# Patient Record
Sex: FEMALE | Race: WHITE | NOT HISPANIC OR LATINO
[De-identification: names, ages, dates, MRNs, and addresses within clinical notes are randomized per-mention and may not be internally consistent; named-entity substitution may affect disease eponyms.]

---

## 2017-02-14 ENCOUNTER — APPOINTMENT (OUTPATIENT)
Dept: DERMATOLOGY | Facility: CLINIC | Age: 62
End: 2017-02-14

## 2017-07-23 ENCOUNTER — TRANSCRIPTION ENCOUNTER (OUTPATIENT)
Age: 62
End: 2017-07-23

## 2017-10-31 ENCOUNTER — APPOINTMENT (OUTPATIENT)
Dept: FAMILY MEDICINE | Facility: CLINIC | Age: 62
End: 2017-10-31
Payer: COMMERCIAL

## 2017-10-31 ENCOUNTER — NON-APPOINTMENT (OUTPATIENT)
Age: 62
End: 2017-10-31

## 2017-10-31 VITALS
OXYGEN SATURATION: 98 % | HEART RATE: 67 BPM | RESPIRATION RATE: 14 BRPM | SYSTOLIC BLOOD PRESSURE: 110 MMHG | WEIGHT: 146 LBS | BODY MASS INDEX: 25.87 KG/M2 | DIASTOLIC BLOOD PRESSURE: 70 MMHG | HEIGHT: 63 IN

## 2017-10-31 DIAGNOSIS — Z13.820 ENCOUNTER FOR SCREENING FOR OSTEOPOROSIS: ICD-10-CM

## 2017-10-31 PROCEDURE — 99396 PREV VISIT EST AGE 40-64: CPT | Mod: 25

## 2017-10-31 PROCEDURE — 36415 COLL VENOUS BLD VENIPUNCTURE: CPT

## 2017-10-31 PROCEDURE — 93000 ELECTROCARDIOGRAM COMPLETE: CPT

## 2017-10-31 PROCEDURE — 90688 IIV4 VACCINE SPLT 0.5 ML IM: CPT

## 2017-10-31 PROCEDURE — G0008: CPT

## 2017-11-01 LAB
25(OH)D3 SERPL-MCNC: 47 NG/ML
ALBUMIN SERPL ELPH-MCNC: 4.3 G/DL
ALP BLD-CCNC: 67 U/L
ALT SERPL-CCNC: 25 U/L
ANION GAP SERPL CALC-SCNC: 13 MMOL/L
APPEARANCE: CLEAR
AST SERPL-CCNC: 28 U/L
BACTERIA: NEGATIVE
BASOPHILS # BLD AUTO: 0.03 K/UL
BASOPHILS NFR BLD AUTO: 0.4 %
BILIRUB SERPL-MCNC: 0.3 MG/DL
BILIRUBIN URINE: NEGATIVE
BLOOD URINE: NEGATIVE
BUN SERPL-MCNC: 9 MG/DL
CALCIUM SERPL-MCNC: 9.3 MG/DL
CHLORIDE SERPL-SCNC: 105 MMOL/L
CHOLEST SERPL-MCNC: 189 MG/DL
CHOLEST/HDLC SERPL: 3.2 RATIO
CO2 SERPL-SCNC: 25 MMOL/L
COLOR: YELLOW
CREAT SERPL-MCNC: 0.8 MG/DL
EOSINOPHIL # BLD AUTO: 0.17 K/UL
EOSINOPHIL NFR BLD AUTO: 2.5 %
FOLATE SERPL-MCNC: >20 NG/ML
GLUCOSE QUALITATIVE U: NEGATIVE MG/DL
GLUCOSE SERPL-MCNC: 98 MG/DL
HBA1C MFR BLD HPLC: 5.6 %
HCT VFR BLD CALC: 43.1 %
HDLC SERPL-MCNC: 59 MG/DL
HGB BLD-MCNC: 13.9 G/DL
HYALINE CASTS: 0 /LPF
IMM GRANULOCYTES NFR BLD AUTO: 0.3 %
KETONES URINE: NEGATIVE
LDLC SERPL CALC-MCNC: 113 MG/DL
LEUKOCYTE ESTERASE URINE: NEGATIVE
LYMPHOCYTES # BLD AUTO: 2.1 K/UL
LYMPHOCYTES NFR BLD AUTO: 30.5 %
MAN DIFF?: NORMAL
MCHC RBC-ENTMCNC: 29.1 PG
MCHC RBC-ENTMCNC: 32.3 GM/DL
MCV RBC AUTO: 90.4 FL
MICROSCOPIC-UA: NORMAL
MONOCYTES # BLD AUTO: 0.33 K/UL
MONOCYTES NFR BLD AUTO: 4.8 %
NEUTROPHILS # BLD AUTO: 4.23 K/UL
NEUTROPHILS NFR BLD AUTO: 61.5 %
NITRITE URINE: NEGATIVE
PH URINE: 6
PLATELET # BLD AUTO: 217 K/UL
POTASSIUM SERPL-SCNC: 4.6 MMOL/L
PROT SERPL-MCNC: 7.3 G/DL
PROTEIN URINE: NEGATIVE MG/DL
RBC # BLD: 4.77 M/UL
RBC # FLD: 13.7 %
RED BLOOD CELLS URINE: 0 /HPF
SODIUM SERPL-SCNC: 143 MMOL/L
SPECIFIC GRAVITY URINE: 1.01
SQUAMOUS EPITHELIAL CELLS: 4 /HPF
T4 FREE SERPL-MCNC: 1.2 NG/DL
TRIGL SERPL-MCNC: 87 MG/DL
TSH SERPL-ACNC: 1.21 UIU/ML
URATE SERPL-MCNC: 3.7 MG/DL
UROBILINOGEN URINE: NEGATIVE MG/DL
VIT B12 SERPL-MCNC: 613 PG/ML
WBC # FLD AUTO: 6.88 K/UL
WHITE BLOOD CELLS URINE: 1 /HPF

## 2017-11-14 LAB — HEMOCCULT STL QL IA: NEGATIVE

## 2017-11-16 ENCOUNTER — FORM ENCOUNTER (OUTPATIENT)
Age: 62
End: 2017-11-16

## 2017-11-17 ENCOUNTER — OUTPATIENT (OUTPATIENT)
Dept: OUTPATIENT SERVICES | Facility: HOSPITAL | Age: 62
LOS: 1 days | End: 2017-11-17
Payer: COMMERCIAL

## 2017-11-17 ENCOUNTER — APPOINTMENT (OUTPATIENT)
Dept: RADIOLOGY | Facility: CLINIC | Age: 62
End: 2017-11-17
Payer: COMMERCIAL

## 2017-11-17 DIAGNOSIS — Z00.8 ENCOUNTER FOR OTHER GENERAL EXAMINATION: ICD-10-CM

## 2017-11-17 PROCEDURE — 77080 DXA BONE DENSITY AXIAL: CPT | Mod: 26

## 2017-11-17 PROCEDURE — 77080 DXA BONE DENSITY AXIAL: CPT

## 2017-11-20 ENCOUNTER — RESULT REVIEW (OUTPATIENT)
Age: 62
End: 2017-11-20

## 2017-11-28 ENCOUNTER — RESULT REVIEW (OUTPATIENT)
Age: 62
End: 2017-11-28

## 2017-12-14 ENCOUNTER — FORM ENCOUNTER (OUTPATIENT)
Age: 62
End: 2017-12-14

## 2017-12-15 ENCOUNTER — OUTPATIENT (OUTPATIENT)
Dept: OUTPATIENT SERVICES | Facility: HOSPITAL | Age: 62
LOS: 1 days | End: 2017-12-15
Payer: COMMERCIAL

## 2017-12-15 ENCOUNTER — APPOINTMENT (OUTPATIENT)
Dept: RADIOLOGY | Facility: CLINIC | Age: 62
End: 2017-12-15
Payer: COMMERCIAL

## 2017-12-15 ENCOUNTER — APPOINTMENT (OUTPATIENT)
Dept: FAMILY MEDICINE | Facility: CLINIC | Age: 62
End: 2017-12-15
Payer: COMMERCIAL

## 2017-12-15 VITALS
OXYGEN SATURATION: 98 % | WEIGHT: 146 LBS | SYSTOLIC BLOOD PRESSURE: 110 MMHG | HEART RATE: 74 BPM | BODY MASS INDEX: 25.87 KG/M2 | RESPIRATION RATE: 12 BRPM | HEIGHT: 63 IN | TEMPERATURE: 97.8 F | DIASTOLIC BLOOD PRESSURE: 64 MMHG

## 2017-12-15 DIAGNOSIS — Z00.8 ENCOUNTER FOR OTHER GENERAL EXAMINATION: ICD-10-CM

## 2017-12-15 PROCEDURE — 71046 X-RAY EXAM CHEST 2 VIEWS: CPT

## 2017-12-15 PROCEDURE — 99213 OFFICE O/P EST LOW 20 MIN: CPT

## 2017-12-15 PROCEDURE — 71020: CPT | Mod: 26

## 2017-12-15 RX ORDER — NEOMYCIN AND POLYMYXIN B SULFATES AND DEXAMETHASONE 3.5; 10000; 1 MG/G; [IU]/G; MG/G
3.5-10000-0.1 OINTMENT OPHTHALMIC
Qty: 4 | Refills: 0 | Status: DISCONTINUED | COMMUNITY
Start: 2017-06-20

## 2017-12-15 RX ORDER — AMOXICILLIN AND CLAVULANATE POTASSIUM 875; 125 MG/1; MG/1
875-125 TABLET, COATED ORAL
Qty: 14 | Refills: 0 | Status: DISCONTINUED | COMMUNITY
Start: 2017-10-07

## 2018-03-02 ENCOUNTER — APPOINTMENT (OUTPATIENT)
Dept: FAMILY MEDICINE | Facility: CLINIC | Age: 63
End: 2018-03-02
Payer: COMMERCIAL

## 2018-03-02 VITALS
HEIGHT: 63 IN | DIASTOLIC BLOOD PRESSURE: 70 MMHG | OXYGEN SATURATION: 98 % | HEART RATE: 67 BPM | TEMPERATURE: 98.5 F | SYSTOLIC BLOOD PRESSURE: 120 MMHG | RESPIRATION RATE: 14 BRPM | WEIGHT: 142 LBS | BODY MASS INDEX: 25.16 KG/M2

## 2018-03-02 DIAGNOSIS — Z87.09 PERSONAL HISTORY OF OTHER DISEASES OF THE RESPIRATORY SYSTEM: ICD-10-CM

## 2018-03-02 PROCEDURE — 99214 OFFICE O/P EST MOD 30 MIN: CPT

## 2018-03-02 RX ORDER — FLUTICASONE PROPIONATE 50 UG/1
50 SPRAY, METERED NASAL DAILY
Qty: 1 | Refills: 0 | Status: ACTIVE | COMMUNITY
Start: 2018-03-02 | End: 1900-01-01

## 2018-03-02 RX ORDER — AZITHROMYCIN 250 MG/1
250 TABLET, FILM COATED ORAL
Qty: 1 | Refills: 0 | Status: DISCONTINUED | COMMUNITY
Start: 2017-12-15 | End: 2018-03-02

## 2018-05-16 ENCOUNTER — APPOINTMENT (OUTPATIENT)
Dept: FAMILY MEDICINE | Facility: CLINIC | Age: 63
End: 2018-05-16
Payer: COMMERCIAL

## 2018-05-16 VITALS
SYSTOLIC BLOOD PRESSURE: 106 MMHG | TEMPERATURE: 98.5 F | WEIGHT: 146.25 LBS | HEART RATE: 68 BPM | DIASTOLIC BLOOD PRESSURE: 64 MMHG | OXYGEN SATURATION: 98 % | HEIGHT: 62 IN | BODY MASS INDEX: 26.91 KG/M2

## 2018-05-16 DIAGNOSIS — J30.9 ALLERGIC RHINITIS, UNSPECIFIED: ICD-10-CM

## 2018-05-16 PROCEDURE — 99213 OFFICE O/P EST LOW 20 MIN: CPT

## 2018-05-16 RX ORDER — AMOXICILLIN AND CLAVULANATE POTASSIUM 875; 125 MG/1; MG/1
875-125 TABLET, COATED ORAL
Qty: 14 | Refills: 0 | Status: DISCONTINUED | COMMUNITY
Start: 2018-03-02 | End: 2018-05-16

## 2018-05-16 NOTE — REVIEW OF SYSTEMS
[Fever] : no fever [Chills] : no chills [Chest Pain] : no chest pain [Shortness Of Breath] : no shortness of breath [Cough] : no cough [FreeTextEntry4] : as per HPI

## 2018-05-16 NOTE — HISTORY OF PRESENT ILLNESS
[FreeTextEntry8] : \par 62 year old female presents c/o bilateral ears feeling clogged along with intermittent ear pains, sinus congestion and pressure. No cough, no fever. Symptoms started a few weeks ago, has been using Fluticasone nasal spray with some improvement but over the past couple of days, ear pain developed.

## 2018-05-16 NOTE — ASSESSMENT
[FreeTextEntry1] : c/w Fluticasone nasal spray\par over the counter Claritin\par consider Medrol dose pack if no improvement\par

## 2018-05-16 NOTE — PHYSICAL EXAM
[No Acute Distress] : no acute distress [Well Nourished] : well nourished [Well Developed] : well developed [Normal Oropharynx] : the oropharynx was normal [Normal Appearance] : was normal in appearance [Neck Supple] : was supple [No Respiratory Distress] : no respiratory distress  [Clear to Auscultation] : lungs were clear to auscultation bilaterally [Normal Rhythm/Effort] : normal respiratory rhythm and effort [Normal Rate] : normal rate  [Regular Rhythm] : with a regular rhythm [Normal S1, S2] : normal S1 and S2 [No Murmur] : no murmur heard [Normal Anterior Cervical Nodes] : no anterior cervical lymphadenopathy [Alert and Oriented x3] : oriented to person, place, and time [de-identified] : Bilateral TM's congested, no erythema; no sinus tenderness; postnasal drip present

## 2018-05-21 ENCOUNTER — RX RENEWAL (OUTPATIENT)
Age: 63
End: 2018-05-21

## 2018-07-20 ENCOUNTER — APPOINTMENT (OUTPATIENT)
Dept: FAMILY MEDICINE | Facility: CLINIC | Age: 63
End: 2018-07-20
Payer: COMMERCIAL

## 2018-07-20 VITALS
WEIGHT: 144 LBS | HEART RATE: 73 BPM | DIASTOLIC BLOOD PRESSURE: 70 MMHG | TEMPERATURE: 98.3 F | OXYGEN SATURATION: 98 % | SYSTOLIC BLOOD PRESSURE: 120 MMHG | HEIGHT: 62 IN | BODY MASS INDEX: 26.5 KG/M2

## 2018-07-20 DIAGNOSIS — H69.83 OTHER SPECIFIED DISORDERS OF EUSTACHIAN TUBE, BILATERAL: ICD-10-CM

## 2018-07-20 DIAGNOSIS — Z87.09 PERSONAL HISTORY OF OTHER DISEASES OF THE RESPIRATORY SYSTEM: ICD-10-CM

## 2018-07-20 PROCEDURE — 99213 OFFICE O/P EST LOW 20 MIN: CPT

## 2018-07-20 NOTE — HISTORY OF PRESENT ILLNESS
[FreeTextEntry8] : \par 62 year old female presents c/o ringing in her ears, ears feeling clogged, feeling slightly off balance x 1 week. No ear pain. Has been on Ibuprofen regularly to treat foot pain which was advised by her podiatrist, concerned could be masking ear infection. Taking Claritin, using Flonase nasal spray.\par \par Reports up to date with mammogram.

## 2018-07-20 NOTE — ASSESSMENT
[FreeTextEntry1] : c/w Fluticasone nasal spray, Claritin\par if symptoms worsen, start Medrol dose pack (hold Ibuprofen/NSAIDS at that time)\par see ENT if symptoms persist

## 2018-07-20 NOTE — PHYSICAL EXAM
[No Acute Distress] : no acute distress [Well Nourished] : well nourished [Well Developed] : well developed [Well-Appearing] : well-appearing [Normal Appearance] : was normal in appearance [Neck Supple] : was supple [No Respiratory Distress] : no respiratory distress  [Clear to Auscultation] : lungs were clear to auscultation bilaterally [Normal Rhythm/Effort] : normal respiratory rhythm and effort [Normal Rate] : normal rate  [Regular Rhythm] : with a regular rhythm [Normal S1, S2] : normal S1 and S2 [No Murmur] : no murmur heard [Normal Anterior Cervical Nodes] : no anterior cervical lymphadenopathy [Alert and Oriented x3] : oriented to person, place, and time [de-identified] : bilateral TM's congested, right>left, no erythema; no sinus tenderness; postnasal drip present

## 2018-11-02 ENCOUNTER — NON-APPOINTMENT (OUTPATIENT)
Age: 63
End: 2018-11-02

## 2018-11-02 ENCOUNTER — LABORATORY RESULT (OUTPATIENT)
Age: 63
End: 2018-11-02

## 2018-11-02 ENCOUNTER — APPOINTMENT (OUTPATIENT)
Dept: FAMILY MEDICINE | Facility: CLINIC | Age: 63
End: 2018-11-02
Payer: COMMERCIAL

## 2018-11-02 VITALS
HEART RATE: 65 BPM | OXYGEN SATURATION: 97 % | SYSTOLIC BLOOD PRESSURE: 100 MMHG | WEIGHT: 145.25 LBS | DIASTOLIC BLOOD PRESSURE: 82 MMHG | HEIGHT: 63 IN | BODY MASS INDEX: 25.73 KG/M2

## 2018-11-02 VITALS — SYSTOLIC BLOOD PRESSURE: 102 MMHG | RESPIRATION RATE: 14 BRPM | DIASTOLIC BLOOD PRESSURE: 60 MMHG

## 2018-11-02 DIAGNOSIS — Z12.11 ENCOUNTER FOR SCREENING FOR MALIGNANT NEOPLASM OF COLON: ICD-10-CM

## 2018-11-02 DIAGNOSIS — Z13.220 ENCOUNTER FOR SCREENING FOR LIPOID DISORDERS: ICD-10-CM

## 2018-11-02 DIAGNOSIS — Z13.0 ENCOUNTER FOR SCREENING FOR OTHER SUSPECTED ENDOCRINE DISORDER: ICD-10-CM

## 2018-11-02 DIAGNOSIS — Z11.59 ENCOUNTER FOR SCREENING FOR OTHER VIRAL DISEASES: ICD-10-CM

## 2018-11-02 DIAGNOSIS — Z13.29 ENCOUNTER FOR SCREENING FOR OTHER SUSPECTED ENDOCRINE DISORDER: ICD-10-CM

## 2018-11-02 DIAGNOSIS — M85.80 OTHER SPECIFIED DISORDERS OF BONE DENSITY AND STRUCTURE, UNSPECIFIED SITE: ICD-10-CM

## 2018-11-02 DIAGNOSIS — Z23 ENCOUNTER FOR IMMUNIZATION: ICD-10-CM

## 2018-11-02 DIAGNOSIS — Z13.228 ENCOUNTER FOR SCREENING FOR OTHER SUSPECTED ENDOCRINE DISORDER: ICD-10-CM

## 2018-11-02 DIAGNOSIS — M19.90 UNSPECIFIED OSTEOARTHRITIS, UNSPECIFIED SITE: ICD-10-CM

## 2018-11-02 PROCEDURE — G0008: CPT

## 2018-11-02 PROCEDURE — 99396 PREV VISIT EST AGE 40-64: CPT | Mod: 25

## 2018-11-02 PROCEDURE — 90686 IIV4 VACC NO PRSV 0.5 ML IM: CPT

## 2018-11-02 PROCEDURE — 93000 ELECTROCARDIOGRAM COMPLETE: CPT

## 2018-11-02 RX ORDER — METHYLPREDNISOLONE 4 MG/1
4 TABLET ORAL
Qty: 1 | Refills: 0 | Status: DISCONTINUED | COMMUNITY
Start: 2018-05-21 | End: 2018-11-02

## 2018-11-05 LAB
25(OH)D3 SERPL-MCNC: 41.9 NG/ML
ALBUMIN SERPL ELPH-MCNC: 4.5 G/DL
ALP BLD-CCNC: 69 U/L
ALT SERPL-CCNC: 25 U/L
ANION GAP SERPL CALC-SCNC: 13 MMOL/L
APPEARANCE: CLEAR
AST SERPL-CCNC: 25 U/L
B BURGDOR IGG+IGM SER QL IB: NORMAL
BACTERIA: NEGATIVE
BASOPHILS # BLD AUTO: 0.03 K/UL
BASOPHILS NFR BLD AUTO: 0.4 %
BILIRUB SERPL-MCNC: 0.5 MG/DL
BILIRUBIN URINE: NEGATIVE
BLOOD URINE: NEGATIVE
BUN SERPL-MCNC: 12 MG/DL
CALCIUM SERPL-MCNC: 9.5 MG/DL
CHLORIDE SERPL-SCNC: 105 MMOL/L
CHOLEST SERPL-MCNC: 202 MG/DL
CHOLEST/HDLC SERPL: 3.5 RATIO
CO2 SERPL-SCNC: 26 MMOL/L
COLOR: YELLOW
CREAT SERPL-MCNC: 0.9 MG/DL
EOSINOPHIL # BLD AUTO: 0.19 K/UL
EOSINOPHIL NFR BLD AUTO: 2.5 %
FOLATE SERPL-MCNC: 18.2 NG/ML
GLUCOSE QUALITATIVE U: NEGATIVE MG/DL
GLUCOSE SERPL-MCNC: 95 MG/DL
HBA1C MFR BLD HPLC: 5.7 %
HCT VFR BLD CALC: 43.4 %
HCV AB SER QL: NONREACTIVE
HCV S/CO RATIO: 0.18 S/CO
HDLC SERPL-MCNC: 58 MG/DL
HGB BLD-MCNC: 14.5 G/DL
HIV1+2 AB SPEC QL IA.RAPID: NONREACTIVE
HYALINE CASTS: 2 /LPF
IMM GRANULOCYTES NFR BLD AUTO: 0.4 %
KETONES URINE: NEGATIVE
LDLC SERPL CALC-MCNC: 125 MG/DL
LEUKOCYTE ESTERASE URINE: NEGATIVE
LYMPHOCYTES # BLD AUTO: 2.19 K/UL
LYMPHOCYTES NFR BLD AUTO: 28.3 %
MAN DIFF?: NORMAL
MCHC RBC-ENTMCNC: 29.8 PG
MCHC RBC-ENTMCNC: 33.4 GM/DL
MCV RBC AUTO: 89.3 FL
MICROSCOPIC-UA: NORMAL
MONOCYTES # BLD AUTO: 0.31 K/UL
MONOCYTES NFR BLD AUTO: 4 %
NEUTROPHILS # BLD AUTO: 5 K/UL
NEUTROPHILS NFR BLD AUTO: 64.4 %
NITRITE URINE: NEGATIVE
PH URINE: 6
PLATELET # BLD AUTO: 196 K/UL
POTASSIUM SERPL-SCNC: 4.4 MMOL/L
PROT SERPL-MCNC: 7.4 G/DL
PROTEIN URINE: NEGATIVE MG/DL
RBC # BLD: 4.86 M/UL
RBC # FLD: 13.9 %
RED BLOOD CELLS URINE: 1 /HPF
SODIUM SERPL-SCNC: 144 MMOL/L
SPECIFIC GRAVITY URINE: 1.01
SQUAMOUS EPITHELIAL CELLS: 3 /HPF
T4 FREE SERPL-MCNC: 1.2 NG/DL
TRIGL SERPL-MCNC: 97 MG/DL
TSH SERPL-ACNC: 1.16 UIU/ML
URATE SERPL-MCNC: 3.8 MG/DL
UROBILINOGEN URINE: NEGATIVE MG/DL
VIT B12 SERPL-MCNC: 705 PG/ML
WBC # FLD AUTO: 7.75 K/UL
WHITE BLOOD CELLS URINE: 1 /HPF

## 2018-11-05 NOTE — HISTORY OF PRESENT ILLNESS
[FreeTextEntry1] : Annual physical  [de-identified] : \par 63 year old female presents for annual physical. \par \par She currently feels well today. \par \par ,  passed away 6 years ago\par Has 2 sons who are , has young grandchildren\par \par Agrees for a flu vaccine, no previous vaccination reactions\par \par Will be following up with GYN at the end of the month\par Up to date with mammogram\par \par Declines colonoscopy

## 2018-11-05 NOTE — ASSESSMENT
[FreeTextEntry1] : check blood work \par follow up with optometry/ophthalmology and dentist for routine exams\par follow up with GYN\par up to date with mammogram, will get script for repeat mammogram from GYN\par refuses colonoscopy, check fecal globin\par c/w diet and exercise as tolerated\par Flu vaccine given, patient tolerated well \par \par Osteopenia- c/w calcium/vitamin D supplementation, weight bearing exercises as tolerated \par \par Arthritis- c/w Tylenol when needed for pain control\par

## 2018-11-05 NOTE — HEALTH RISK ASSESSMENT
[No falls in past year] : Patient reported no falls in the past year [0] : 2) Feeling down, depressed, or hopeless: Not at all (0) [Patient reported mammogram was normal] : Patient reported mammogram was normal [Patient reported PAP Smear was normal] : Patient reported PAP Smear was normal [Patient declined colonoscopy] : Patient declined colonoscopy [HIV Test offered] : HIV Test offered [Hepatitis C test offered] : Hepatitis C test offered [Alone] : lives alone [Employed] : employed [] :  [# Of Children ___] : has [unfilled] children [Smoke Detector] : smoke detector [Carbon Monoxide Detector] : carbon monoxide detector [Seat Belt] :  uses seat belt [Sunscreen] : uses sunscreen [] : No [de-identified] : quit over 25 years ago [de-identified] : rare alcohol use [Reports changes in hearing] : Reports no changes in hearing [Reports changes in vision] : Reports no changes in vision [MammogramDate] : 01/18 [PapSmearDate] : 11/17 [BoneDensityDate] : 11/17 [BoneDensityComments] : Osteopenia  [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

## 2018-11-05 NOTE — PHYSICAL EXAM
[No Acute Distress] : no acute distress [Well Nourished] : well nourished [Well Developed] : well developed [Well-Appearing] : well-appearing [Normal Sclera/Conjunctiva] : normal sclera/conjunctiva [PERRL] : pupils equal round and reactive to light [Normal Oropharynx] : the oropharynx was normal [Normal TMs] : both tympanic membranes were normal [Normal Appearance] : was normal in appearance [Neck Supple] : was supple [Normal] : the thyroid was normal [No Respiratory Distress] : no respiratory distress  [Clear to Auscultation] : lungs were clear to auscultation bilaterally [Normal Rhythm/Effort] : normal respiratory rhythm and effort [Normal Rate] : normal rate  [Regular Rhythm] : with a regular rhythm [Normal S1, S2] : normal S1 and S2 [No Murmur] : no murmur heard [No Carotid Bruits] : no carotid bruits [No Edema] : there was no peripheral edema [Soft] : abdomen soft [Non Tender] : non-tender [Normal Bowel Sounds] : normal bowel sounds [Normal Posterior Cervical Nodes] : no posterior cervical lymphadenopathy [Normal Anterior Cervical Nodes] : no anterior cervical lymphadenopathy [No Spinal Tenderness] : no spinal tenderness [Grossly Normal Strength/Tone] : grossly normal strength/tone [No Rash] : no rash [Normal Gait] : normal gait [No Focal Deficits] : no focal deficits [Normal Affect] : the affect was normal [Alert and Oriented x3] : oriented to person, place, and time [Normal Mood] : the mood was normal [de-identified] : Breast exam deferred- to be done by GYN

## 2018-11-07 LAB — HEMOCCULT STL QL IA: NEGATIVE

## 2018-12-06 ENCOUNTER — LABORATORY RESULT (OUTPATIENT)
Age: 63
End: 2018-12-06

## 2019-03-05 ENCOUNTER — TRANSCRIPTION ENCOUNTER (OUTPATIENT)
Age: 64
End: 2019-03-05

## 2019-03-21 ENCOUNTER — APPOINTMENT (OUTPATIENT)
Dept: FAMILY MEDICINE | Facility: CLINIC | Age: 64
End: 2019-03-21
Payer: COMMERCIAL

## 2019-03-21 VITALS
RESPIRATION RATE: 16 BRPM | WEIGHT: 133 LBS | SYSTOLIC BLOOD PRESSURE: 100 MMHG | DIASTOLIC BLOOD PRESSURE: 60 MMHG | OXYGEN SATURATION: 99 % | HEART RATE: 71 BPM | BODY MASS INDEX: 23.57 KG/M2 | HEIGHT: 63 IN

## 2019-03-21 DIAGNOSIS — H69.80 OTHER SPECIFIED DISORDERS OF EUSTACHIAN TUBE, UNSPECIFIED EAR: ICD-10-CM

## 2019-03-21 PROCEDURE — 99213 OFFICE O/P EST LOW 20 MIN: CPT

## 2019-03-21 NOTE — PHYSICAL EXAM
[No Acute Distress] : no acute distress [Normal Sclera/Conjunctiva] : normal sclera/conjunctiva [Normal Outer Ear/Nose] : the outer ears and nose were normal in appearance [Normal Oropharynx] : the oropharynx was normal [Supple] : supple [No Lymphadenopathy] : no lymphadenopathy [No Respiratory Distress] : no respiratory distress

## 2019-03-21 NOTE — HISTORY OF PRESENT ILLNESS
[FreeTextEntry8] : feels crackling in ears mild discomfort history ETD on antihistamines has had ent workup no fever or cough

## 2019-06-19 ENCOUNTER — RECORD ABSTRACTING (OUTPATIENT)
Age: 64
End: 2019-06-19

## 2019-06-19 DIAGNOSIS — Z92.89 PERSONAL HISTORY OF OTHER MEDICAL TREATMENT: ICD-10-CM

## 2019-06-19 DIAGNOSIS — N95.2 POSTMENOPAUSAL ATROPHIC VAGINITIS: ICD-10-CM

## 2019-06-19 DIAGNOSIS — Z82.49 FAMILY HISTORY OF ISCHEMIC HEART DISEASE AND OTHER DISEASES OF THE CIRCULATORY SYSTEM: ICD-10-CM

## 2019-06-19 DIAGNOSIS — Z81.8 FAMILY HISTORY OF OTHER MENTAL AND BEHAVIORAL DISORDERS: ICD-10-CM

## 2019-06-19 LAB — CYTOLOGY CVX/VAG DOC THIN PREP: NORMAL

## 2019-06-20 ENCOUNTER — APPOINTMENT (OUTPATIENT)
Dept: OBGYN | Facility: CLINIC | Age: 64
End: 2019-06-20
Payer: COMMERCIAL

## 2019-06-20 VITALS
HEIGHT: 63 IN | BODY MASS INDEX: 23.39 KG/M2 | WEIGHT: 132 LBS | DIASTOLIC BLOOD PRESSURE: 62 MMHG | SYSTOLIC BLOOD PRESSURE: 109 MMHG

## 2019-06-20 DIAGNOSIS — Z11.3 ENCOUNTER FOR SCREENING FOR INFECTIONS WITH A PREDOMINANTLY SEXUAL MODE OF TRANSMISSION: ICD-10-CM

## 2019-06-20 DIAGNOSIS — Z00.00 ENCOUNTER FOR GENERAL ADULT MEDICAL EXAMINATION W/OUT ABNORMAL FINDINGS: ICD-10-CM

## 2019-06-20 LAB
BILIRUB UR QL STRIP: NORMAL
GLUCOSE UR-MCNC: NORMAL
HCG UR QL: 0.2 EU/DL
HGB UR QL STRIP.AUTO: NORMAL
KETONES UR-MCNC: NORMAL
LEUKOCYTE ESTERASE UR QL STRIP: NORMAL
NITRITE UR QL STRIP: NORMAL
PH UR STRIP: 5
PROT UR STRIP-MCNC: NORMAL
SP GR UR STRIP: 1

## 2019-06-20 PROCEDURE — 99213 OFFICE O/P EST LOW 20 MIN: CPT

## 2019-06-20 PROCEDURE — 81003 URINALYSIS AUTO W/O SCOPE: CPT | Mod: QW

## 2019-06-20 NOTE — HISTORY OF PRESENT ILLNESS
[Last Bone Density ___] : Last bone density studies [unfilled] [Last Mammogram ___] : Last Mammogram was [unfilled] [Last Pap ___] : Last cervical pap smear was [unfilled] [Itching] : itching [Burning] : burning [Definite:  ___ (Date)] : the last menstrual period was [unfilled] [Male ___] : [unfilled] male [Sexually Active] : is sexually active

## 2019-06-21 LAB
C TRACH RRNA SPEC QL NAA+PROBE: NOT DETECTED
N GONORRHOEA RRNA SPEC QL NAA+PROBE: NOT DETECTED
SOURCE AMPLIFICATION: NORMAL

## 2019-06-23 LAB
CANDIDA VAG CYTO: NOT DETECTED
G VAGINALIS+PREV SP MTYP VAG QL MICRO: NOT DETECTED
T VAGINALIS VAG QL WET PREP: NOT DETECTED

## 2019-06-23 NOTE — PHYSICAL EXAM
[Normal] : uterus [No Bleeding] : there was no active vaginal bleeding [Uterine Adnexae] : were not tender and not enlarged [Labia Majora] : labia major [Labia Minora] : labia minora [Atrophy] : no atrophy

## 2019-10-17 ENCOUNTER — APPOINTMENT (OUTPATIENT)
Dept: FAMILY MEDICINE | Facility: CLINIC | Age: 64
End: 2019-10-17
Payer: MEDICARE

## 2019-10-17 ENCOUNTER — MED ADMIN CHARGE (OUTPATIENT)
Age: 64
End: 2019-10-17

## 2019-10-17 PROCEDURE — G0008: CPT

## 2019-10-17 PROCEDURE — 90674 CCIIV4 VAC NO PRSV 0.5 ML IM: CPT

## 2019-11-14 ENCOUNTER — MESSAGE (OUTPATIENT)
Age: 64
End: 2019-11-14

## 2019-11-14 DIAGNOSIS — R92.2 INCONCLUSIVE MAMMOGRAM: ICD-10-CM

## 2020-01-11 ENCOUNTER — MESSAGE (OUTPATIENT)
Age: 65
End: 2020-01-11

## 2021-04-27 ENCOUNTER — TRANSCRIPTION ENCOUNTER (OUTPATIENT)
Age: 66
End: 2021-04-27

## 2022-02-16 ENCOUNTER — NON-APPOINTMENT (OUTPATIENT)
Age: 67
End: 2022-02-16

## 2022-02-17 ENCOUNTER — APPOINTMENT (OUTPATIENT)
Dept: OBGYN | Facility: CLINIC | Age: 67
End: 2022-02-17
Payer: MEDICARE

## 2022-02-17 VITALS
SYSTOLIC BLOOD PRESSURE: 106 MMHG | BODY MASS INDEX: 26.22 KG/M2 | DIASTOLIC BLOOD PRESSURE: 70 MMHG | WEIGHT: 148 LBS | HEIGHT: 63 IN

## 2022-02-17 DIAGNOSIS — N90.7 VULVAR CYST: ICD-10-CM

## 2022-02-17 PROCEDURE — 99213 OFFICE O/P EST LOW 20 MIN: CPT

## 2022-02-17 RX ORDER — METHYLPREDNISOLONE 4 MG/1
4 TABLET ORAL
Qty: 1 | Refills: 0 | Status: DISCONTINUED | COMMUNITY
Start: 2019-03-21 | End: 2022-02-17

## 2022-03-24 ENCOUNTER — APPOINTMENT (OUTPATIENT)
Dept: OBGYN | Facility: CLINIC | Age: 67
End: 2022-03-24
Payer: MEDICARE

## 2022-03-24 VITALS
HEIGHT: 63 IN | DIASTOLIC BLOOD PRESSURE: 70 MMHG | BODY MASS INDEX: 26.22 KG/M2 | SYSTOLIC BLOOD PRESSURE: 110 MMHG | WEIGHT: 148 LBS

## 2022-03-24 DIAGNOSIS — Z78.9 OTHER SPECIFIED HEALTH STATUS: ICD-10-CM

## 2022-03-24 DIAGNOSIS — Z01.419 ENCOUNTER FOR GYNECOLOGICAL EXAMINATION (GENERAL) (ROUTINE) W/OUT ABNORMAL FINDINGS: ICD-10-CM

## 2022-03-24 PROCEDURE — G0101: CPT

## 2022-03-24 RX ORDER — IBUPROFEN 200 MG/1
200 TABLET, COATED ORAL
Refills: 0 | Status: DISCONTINUED | COMMUNITY
End: 2022-03-24

## 2022-03-24 RX ORDER — GLUC/MSM/COLGN2/HYAL/ANTIARTH3 375-375-20
TABLET ORAL
Refills: 0 | Status: ACTIVE | COMMUNITY

## 2022-03-24 RX ORDER — CALCIUM CITRATE/VITAMIN D3 250MG-5MCG
TABLET ORAL
Refills: 0 | Status: ACTIVE | COMMUNITY

## 2022-03-29 LAB — HPV HIGH+LOW RISK DNA PNL CVX: NOT DETECTED

## 2022-04-01 LAB — CYTOLOGY CVX/VAG DOC THIN PREP: ABNORMAL

## 2022-04-08 PROBLEM — N90.7 LABIAL CYST: Status: ACTIVE | Noted: 2022-04-08

## 2022-04-08 NOTE — PHYSICAL EXAM
[Chaperone Present] : A chaperone was present in the examining room during all aspects of the physical examination [Appropriately responsive] : appropriately responsive [Alert] : alert [No Acute Distress] : no acute distress [Oriented x3] : oriented x3 [Vulvar Atrophy] : vulvar atrophy [FreeTextEntry1] : ALLEN ALVARADO [FreeTextEntry2] : 5 mm sebaceous cyst on right labia at 11 o'clock.

## 2022-04-08 NOTE — HISTORY OF PRESENT ILLNESS
[Patient reported mammogram was normal] : Patient reported mammogram was normal [Patient reported breast sonogram was normal] : Patient reported breast sonogram was normal [postmenopausal] : postmenopausal [N] : Patient does not use contraception [Y] : Positive pregnancy history [Menarche Age: ____] : age at menarche was [unfilled] [No] : Patient does not have concerns regarding sex [Mammogramdate] : 01/2021 [BreastSonogramDate] : 01/2021 [PapSmeardate] : 12/6/18 [TextBox_31] : NEG [GonorrheaDate] : 06/20/19 [TextBox_63] : NEG [ChlamydiaDate] : 06/20/19 [TextBox_68] : NEG [LMPDate] : 17 years ago [PGHxTotal] : 2 [[facility]] : 2 [[facility]] : 2 [FreeTextEntry1] : 17 years ago

## 2022-04-08 NOTE — DISCUSSION/SUMMARY
[FreeTextEntry1] : -Right labial sebaceous cyst-\par 1) On exam, 5 mm right labial sebaceous cyst noted at 11 o'clock. Exam findings were discussed. Patient was re-assured.\par 2) Recommended applying warm compresses and Sitz baths PRN.\par \par -Last annual GYN exam in 2018.\par \par -Follow up in 2-3 weeks for annual GYN exam or PRN. Call parameters were discussed.\par \par All questions and concerns were discussed.

## 2022-04-11 PROBLEM — Z11.59 SCREENING FOR VIRAL DISEASE: Status: ACTIVE | Noted: 2018-11-02

## 2022-06-17 NOTE — DISCUSSION/SUMMARY
[FreeTextEntry1] : -Annual well woman visit done today. Pap collected. Patient declines STI testing.\par \par -Patient states that she was previously given Rx screening mammogram and breast ultrasound by her PCP and is scheduled in April 2022. Benign breast exam.\par \par -h/o osteopenia- She was advised to take vitamin D, calcium, and continue weightbearing exercises. Followup with PCP for her bone density scan. \par \par -Recommend following up with dermatology for annual skin surveillance.\par \par -Follow up in 1 year for her annual GYN exam or PRN.\par \par All questions and concerns were discussed.

## 2022-06-17 NOTE — PHYSICAL EXAM
[Chaperone Present] : A chaperone was present in the examining room during all aspects of the physical examination [Appropriately responsive] : appropriately responsive [Alert] : alert [No Acute Distress] : no acute distress [Soft] : soft [Non-tender] : non-tender [Non-distended] : non-distended [No Mass] : no mass [Oriented x3] : oriented x3 [Examination Of The Breasts] : a normal appearance [No Discharge] : no discharge [No Masses] : no breast masses were palpable [Vulvar Atrophy] : vulvar atrophy [Labia Majora] : normal [Labia Minora] : normal [Pink Rugae] : pink rugae [No Bleeding] : There was no active vaginal bleeding [Normal] : normal [Uterine Adnexae] : normal [FreeTextEntry1] : ALLEN ALVARADO

## 2022-06-17 NOTE — HISTORY OF PRESENT ILLNESS
[postmenopausal] : postmenopausal [Y] : Positive pregnancy history [Menarche Age: ____] : age at menarche was [unfilled] [No] : Patient does not have concerns regarding sex [Patient reported mammogram was normal] : Patient reported mammogram was normal [N] : Patient is not sexually active [Mammogramdate] : 04/2021 [PapSmeardate] : 12/06/18 [TextBox_31] : NEG [GonorrheaDate] : 06/20/19 [TextBox_63] : NEG [ChlamydiaDate] : 06/20/19 [TextBox_68] : NEG [LMPDate] : YEARS AGO [PGHxTotal] : 2 [[facility] [[facility]] : 2 [FreeTextEntry1] : 17 YEARS AGO

## 2023-03-16 ENCOUNTER — OFFICE (OUTPATIENT)
Dept: URBAN - METROPOLITAN AREA CLINIC 12 | Facility: CLINIC | Age: 68
Setting detail: OPHTHALMOLOGY
End: 2023-03-16
Payer: MEDICARE

## 2023-03-16 VITALS — HEIGHT: 55 IN

## 2023-03-16 DIAGNOSIS — H01.004: ICD-10-CM
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
## 2023-03-16 DIAGNOSIS — H01.001: ICD-10-CM

## 2023-03-16 DIAGNOSIS — H01.002: ICD-10-CM

## 2023-03-16 DIAGNOSIS — H00.12: ICD-10-CM

## 2023-03-16 DIAGNOSIS — H01.005: ICD-10-CM

## 2023-03-16 PROCEDURE — 92012 INTRM OPH EXAM EST PATIENT: CPT | Performed by: OPHTHALMOLOGY

## 2023-03-16 ASSESSMENT — CONFRONTATIONAL VISUAL FIELD TEST (CVF)
OS_FINDINGS: FULL
OD_FINDINGS: FULL

## 2023-03-16 ASSESSMENT — VISUAL ACUITY
OD_BCVA: 20/30
OS_BCVA: 20/30+3

## 2023-03-16 ASSESSMENT — REFRACTION_CURRENTRX
OS_AXIS: 000
OD_SPHERE: -1.00
OD_AXIS: 085
OS_SPHERE: -0.50
OS_VPRISM_DIRECTION: SV
OD_CYLINDER: -0.50
OD_VPRISM_DIRECTION: SV
OS_OVR_VA: 20/
OD_OVR_VA: 20/
OS_CYLINDER: SPHERE

## 2023-03-16 ASSESSMENT — SPHEQUIV_DERIVED
OD_SPHEQUIV: -0.5
OS_SPHEQUIV: -0.125

## 2023-03-16 ASSESSMENT — REFRACTION_AUTOREFRACTION
OS_SPHERE: +0.25
OD_AXIS: 075
OD_CYLINDER: -1.50
OS_CYLINDER: -0.75
OD_SPHERE: +0.25
OS_AXIS: 112

## 2023-03-16 ASSESSMENT — KERATOMETRY
OD_AXISANGLE_DEGREES: 169
OS_K2POWER_DIOPTERS: 46.25
OD_K1POWER_DIOPTERS: 45.25
METHOD_AUTO_MANUAL: AUTO
OS_K1POWER_DIOPTERS: 45.75
OS_AXISANGLE_DEGREES: 002
OD_K2POWER_DIOPTERS: 45.75

## 2023-03-16 ASSESSMENT — LID EXAM ASSESSMENTS
OD_BLEPHARITIS: RLL RUL 1+ 2+
OS_BLEPHARITIS: LLL LUL 1+ 2+

## 2023-03-16 ASSESSMENT — TONOMETRY
OD_IOP_MMHG: 11
OS_IOP_MMHG: 16

## 2023-03-16 ASSESSMENT — AXIALLENGTH_DERIVED
OD_AL: 23.0642
OS_AL: 22.7532

## 2023-04-05 ENCOUNTER — OFFICE (OUTPATIENT)
Dept: URBAN - METROPOLITAN AREA CLINIC 12 | Facility: CLINIC | Age: 68
Setting detail: OPHTHALMOLOGY
End: 2023-04-05
Payer: MEDICARE

## 2023-04-05 DIAGNOSIS — H01.004: ICD-10-CM

## 2023-04-05 DIAGNOSIS — H25.13: ICD-10-CM

## 2023-04-05 DIAGNOSIS — H01.002: ICD-10-CM

## 2023-04-05 DIAGNOSIS — H01.005: ICD-10-CM

## 2023-04-05 DIAGNOSIS — H01.001: ICD-10-CM

## 2023-04-05 PROBLEM — H00.12 CHALAZION; RIGHT LOWER LID: Status: RESOLVED | Noted: 2023-03-16 | Resolved: 2023-04-05

## 2023-04-05 PROCEDURE — 92012 INTRM OPH EXAM EST PATIENT: CPT | Performed by: OPHTHALMOLOGY

## 2023-04-05 ASSESSMENT — KERATOMETRY
OS_AXISANGLE_DEGREES: 020
METHOD_AUTO_MANUAL: AUTO
OD_K2POWER_DIOPTERS: 45.75
OS_K1POWER_DIOPTERS: 45.75
OD_AXISANGLE_DEGREES: 174
OS_K2POWER_DIOPTERS: 46.25
OD_K1POWER_DIOPTERS: 45.50

## 2023-04-05 ASSESSMENT — REFRACTION_AUTOREFRACTION
OS_CYLINDER: -0.75
OS_SPHERE: +0.25
OS_AXIS: 130
OD_CYLINDER: -1.25
OD_SPHERE: PLANO
OD_AXIS: 070

## 2023-04-05 ASSESSMENT — TONOMETRY
OS_IOP_MMHG: 16
OD_IOP_MMHG: 11

## 2023-04-05 ASSESSMENT — SPHEQUIV_DERIVED
OS_SPHEQUIV: -0.125
OS_SPHEQUIV: -0.125

## 2023-04-05 ASSESSMENT — REFRACTION_CURRENTRX
OS_AXIS: 000
OS_AXIS: 0
OS_CYLINDER: SPHERE
OS_CYLINDER: SPHERE
OD_AXIS: 085
OD_CYLINDER: -0.50
OD_SPHERE: +1.00
OS_VPRISM_DIRECTION: SV
OS_OVR_VA: 20/
OD_SPHERE: -1.00
OD_OVR_VA: 20/
OS_OVR_VA: 20/
OS_VPRISM_DIRECTION: SV
OD_AXIS: 086
OD_OVR_VA: 20/
OS_SPHERE: -0.50
OD_CYLINDER: -0.50
OD_VPRISM_DIRECTION: SV
OS_SPHERE: +1.50
OD_VPRISM_DIRECTION: SV

## 2023-04-05 ASSESSMENT — REFRACTION_MANIFEST
OS_AXIS: 130
OS_CYLINDER: -0.75
OD_SPHERE: PLANO
OS_SPHERE: +0.25
OD_AXIS: 070
OS_VA1: 20/25+1
OD_CYLINDER: -1.25

## 2023-04-05 ASSESSMENT — LID EXAM ASSESSMENTS
OD_BLEPHARITIS: RLL RUL 1+ 2+
OS_BLEPHARITIS: LLL LUL 1+ 2+

## 2023-04-05 ASSESSMENT — CONFRONTATIONAL VISUAL FIELD TEST (CVF)
OD_FINDINGS: FULL
OS_FINDINGS: FULL

## 2023-04-05 ASSESSMENT — VISUAL ACUITY
OD_BCVA: 20/25-2
OS_BCVA: 20/20-1

## 2023-04-05 ASSESSMENT — AXIALLENGTH_DERIVED
OS_AL: 22.7532
OS_AL: 22.7532

## 2023-10-01 ENCOUNTER — OFFICE (OUTPATIENT)
Dept: URBAN - METROPOLITAN AREA CLINIC 12 | Facility: CLINIC | Age: 68
Setting detail: OPHTHALMOLOGY
End: 2023-10-01
Payer: MEDICARE

## 2023-10-01 VITALS — HEIGHT: 55 IN

## 2023-10-01 DIAGNOSIS — H01.004: ICD-10-CM

## 2023-10-01 DIAGNOSIS — H25.13: ICD-10-CM

## 2023-10-01 DIAGNOSIS — H01.002: ICD-10-CM

## 2023-10-01 DIAGNOSIS — H01.005: ICD-10-CM

## 2023-10-01 DIAGNOSIS — H01.001: ICD-10-CM

## 2023-10-01 PROCEDURE — 99213 OFFICE O/P EST LOW 20 MIN: CPT | Performed by: OPHTHALMOLOGY

## 2023-10-01 ASSESSMENT — REFRACTION_CURRENTRX
OD_SPHERE: +1.00
OD_VPRISM_DIRECTION: SV
OD_OVR_VA: 20/
OD_OVR_VA: 20/
OS_SPHERE: +1.50
OS_SPHERE: PLANO
OD_SPHERE: -1.00
OS_OVR_VA: 20/
OD_AXIS: 085
OS_CYLINDER: SPHERE
OD_CYLINDER: -0.50
OS_VPRISM_DIRECTION: SV
OS_OVR_VA: 20/
OS_AXIS: 0
OD_CYLINDER: -0.50
OD_AXIS: 081
OS_VPRISM_DIRECTION: SV
OS_CYLINDER: SPHERE
OD_VPRISM_DIRECTION: SV
OS_AXIS: 000

## 2023-10-01 ASSESSMENT — CONFRONTATIONAL VISUAL FIELD TEST (CVF)
OS_FINDINGS: FULL
OD_FINDINGS: FULL

## 2023-10-01 ASSESSMENT — KERATOMETRY
OD_K1POWER_DIOPTERS: 25.25
OD_K2POWER_DIOPTERS: 45.75
OS_K1POWER_DIOPTERS: 45.75
OS_AXISANGLE_DEGREES: 008
OS_K2POWER_DIOPTERS: 46.25
OD_AXISANGLE_DEGREES: 178
METHOD_AUTO_MANUAL: AUTO

## 2023-10-01 ASSESSMENT — REFRACTION_MANIFEST
OS_AXIS: 130
OS_CYLINDER: -0.50
OD_SPHERE: PLANO
OD_CYLINDER: -1.25
OD_VA1: 20/25+1
OD_AXIS: 070
OD_CYLINDER: +1.25
OD_AXIS: 070
OS_VA1: 20/30-1
OS_SPHERE: +0.25
OS_AXIS: 130
OS_VA1: 20/25+1
OS_CYLINDER: -0.75
OD_SPHERE: PLANO
OS_SPHERE: +0.25

## 2023-10-01 ASSESSMENT — REFRACTION_AUTOREFRACTION
OS_CYLINDER: -0.75
OS_AXIS: 131
OS_SPHERE: +0.50
OD_SPHERE: PLANO
OD_CYLINDER: -1.25
OD_AXIS: 070

## 2023-10-01 ASSESSMENT — AXIALLENGTH_DERIVED
OS_AL: 22.7532
OS_AL: 22.708
OS_AL: 22.663

## 2023-10-01 ASSESSMENT — LID EXAM ASSESSMENTS
OS_BLEPHARITIS: LLL LUL 1+ 2+
OD_BLEPHARITIS: RLL RUL 1+ 2+

## 2023-10-01 ASSESSMENT — SPHEQUIV_DERIVED
OS_SPHEQUIV: 0
OS_SPHEQUIV: -0.125
OS_SPHEQUIV: 0.125

## 2023-10-01 ASSESSMENT — VISUAL ACUITY
OD_BCVA: 20/30-1
OS_BCVA: 20/25+2

## 2023-10-01 ASSESSMENT — TONOMETRY
OS_IOP_MMHG: 14
OD_IOP_MMHG: 15

## 2023-10-03 ENCOUNTER — OFFICE (OUTPATIENT)
Dept: URBAN - METROPOLITAN AREA CLINIC 12 | Facility: CLINIC | Age: 68
Setting detail: OPHTHALMOLOGY
End: 2023-10-03
Payer: MEDICARE

## 2023-10-03 DIAGNOSIS — H01.002: ICD-10-CM

## 2023-10-03 DIAGNOSIS — H01.001: ICD-10-CM

## 2023-10-03 DIAGNOSIS — H01.004: ICD-10-CM

## 2023-10-03 DIAGNOSIS — H01.005: ICD-10-CM

## 2023-10-03 DIAGNOSIS — H00.12: ICD-10-CM

## 2023-10-03 PROCEDURE — 92012 INTRM OPH EXAM EST PATIENT: CPT | Performed by: STUDENT IN AN ORGANIZED HEALTH CARE EDUCATION/TRAINING PROGRAM

## 2023-10-03 ASSESSMENT — SPHEQUIV_DERIVED
OS_SPHEQUIV: -0.125
OS_SPHEQUIV: 0
OD_SPHEQUIV: -0.5

## 2023-10-03 ASSESSMENT — REFRACTION_CURRENTRX
OD_OVR_VA: 20/
OS_CYLINDER: SPHERE
OD_SPHERE: +1.00
OS_VPRISM_DIRECTION: SV
OS_VPRISM_DIRECTION: SV
OD_SPHERE: -1.00
OD_CYLINDER: -0.50
OS_OVR_VA: 20/
OD_AXIS: 081
OS_SPHERE: PLANO
OS_AXIS: 000
OS_CYLINDER: SPHERE
OS_SPHERE: +1.50
OD_VPRISM_DIRECTION: SV
OD_OVR_VA: 20/
OS_OVR_VA: 20/
OD_AXIS: 085
OD_CYLINDER: -0.50
OD_VPRISM_DIRECTION: SV
OS_AXIS: 0

## 2023-10-03 ASSESSMENT — REFRACTION_AUTOREFRACTION
OD_CYLINDER: -1.50
OS_CYLINDER: 0-0.75
OD_SPHERE: +0.25
OS_AXIS: 129
OD_AXIS: 079
OS_SPHERE: +0.58

## 2023-10-03 ASSESSMENT — AXIALLENGTH_DERIVED
OD_AL: 23.0642
OS_AL: 22.796
OS_AL: 22.7507

## 2023-10-03 ASSESSMENT — REFRACTION_MANIFEST
OS_SPHERE: +0.25
OD_AXIS: 070
OD_VA1: 20/25+1
OS_CYLINDER: -0.50
OS_AXIS: 130
OD_AXIS: 070
OS_CYLINDER: -0.75
OS_SPHERE: +0.25
OD_SPHERE: PLANO
OD_CYLINDER: +1.25
OS_VA1: 20/25+1
OD_SPHERE: PLANO
OD_CYLINDER: -1.25
OS_AXIS: 130
OS_VA1: 20/30-1

## 2023-10-03 ASSESSMENT — KERATOMETRY
METHOD_AUTO_MANUAL: AUTO
OD_AXISANGLE_DEGREES: 002
OS_K2POWER_DIOPTERS: 46.00
OS_K1POWER_DIOPTERS: 45.75
OS_AXISANGLE_DEGREES: 013
OD_K2POWER_DIOPTERS: 45.75
OD_K1POWER_DIOPTERS: 45.25

## 2023-10-03 ASSESSMENT — TONOMETRY
OD_IOP_MMHG: 15
OS_IOP_MMHG: 14

## 2023-10-03 ASSESSMENT — VISUAL ACUITY
OS_BCVA: 20/30-2
OD_BCVA: 20/30-2

## 2023-10-03 ASSESSMENT — LID EXAM ASSESSMENTS
OD_BLEPHARITIS: RLL RUL 1+ 2+
OS_BLEPHARITIS: LLL LUL 1+ 2+

## 2023-10-03 ASSESSMENT — CONFRONTATIONAL VISUAL FIELD TEST (CVF)
OS_FINDINGS: FULL
OD_FINDINGS: FULL

## 2023-11-13 ENCOUNTER — OFFICE (OUTPATIENT)
Dept: URBAN - METROPOLITAN AREA CLINIC 12 | Facility: CLINIC | Age: 68
Setting detail: OPHTHALMOLOGY
End: 2023-11-13
Payer: MEDICARE

## 2023-11-13 DIAGNOSIS — H01.005: ICD-10-CM

## 2023-11-13 DIAGNOSIS — H01.001: ICD-10-CM

## 2023-11-13 DIAGNOSIS — H01.002: ICD-10-CM

## 2023-11-13 DIAGNOSIS — H25.13: ICD-10-CM

## 2023-11-13 DIAGNOSIS — H01.004: ICD-10-CM

## 2023-11-13 PROCEDURE — 99214 OFFICE O/P EST MOD 30 MIN: CPT | Performed by: OPHTHALMOLOGY

## 2023-11-13 ASSESSMENT — CONFRONTATIONAL VISUAL FIELD TEST (CVF)
OD_FINDINGS: FULL
OS_FINDINGS: FULL

## 2023-11-13 ASSESSMENT — REFRACTION_MANIFEST
OS_VA1: 20/30-1
OS_AXIS: 130
OD_SPHERE: PLANO
OD_AXIS: 070
OD_VA1: 20/25+1
OS_CYLINDER: -0.75
OS_VA1: 20/25+1
OS_CYLINDER: -0.50
OS_SPHERE: +0.25
OD_SPHERE: PLANO
OD_CYLINDER: +1.25
OS_AXIS: 130
OD_CYLINDER: -1.25
OS_SPHERE: +0.25
OD_AXIS: 070

## 2023-11-13 ASSESSMENT — REFRACTION_CURRENTRX
OS_SPHERE: +1.50
OD_VPRISM_DIRECTION: SV
OD_OVR_VA: 20/
OD_SPHERE: +1.00
OS_CYLINDER: SPHERE
OD_OVR_VA: 20/
OD_CYLINDER: -0.50
OD_SPHERE: -1.00
OS_OVR_VA: 20/
OS_CYLINDER: SPHERE
OS_VPRISM_DIRECTION: SV
OS_SPHERE: PLANO
OD_VPRISM_DIRECTION: SV
OD_CYLINDER: -0.50
OS_VPRISM_DIRECTION: SV
OS_OVR_VA: 20/
OS_AXIS: 0
OS_AXIS: 000
OD_AXIS: 081
OD_AXIS: 085

## 2023-11-13 ASSESSMENT — SPHEQUIV_DERIVED
OS_SPHEQUIV: 0
OD_SPHEQUIV: -1
OS_SPHEQUIV: -0.125

## 2023-11-13 ASSESSMENT — REFRACTION_AUTOREFRACTION
OS_SPHERE: PLANO
OS_CYLINDER: -0.50
OD_SPHERE: -0.50
OD_AXIS: 067
OS_AXIS: 135
OD_CYLINDER: -1.00

## 2023-11-13 ASSESSMENT — LID EXAM ASSESSMENTS
OD_BLEPHARITIS: RLL RUL 1+ 2+
OS_BLEPHARITIS: LLL LUL 1+ 2+

## 2024-01-04 ENCOUNTER — OFFICE (OUTPATIENT)
Dept: URBAN - METROPOLITAN AREA CLINIC 12 | Facility: CLINIC | Age: 69
Setting detail: OPHTHALMOLOGY
End: 2024-01-04
Payer: MEDICARE

## 2024-01-04 DIAGNOSIS — H25.11: ICD-10-CM

## 2024-01-04 DIAGNOSIS — H25.13: ICD-10-CM

## 2024-01-04 PROCEDURE — 92136 OPHTHALMIC BIOMETRY: CPT | Mod: 26,RT | Performed by: OPHTHALMOLOGY

## 2024-01-04 PROCEDURE — 99213 OFFICE O/P EST LOW 20 MIN: CPT | Performed by: OPHTHALMOLOGY

## 2024-01-04 PROCEDURE — 92136 OPHTHALMIC BIOMETRY: CPT | Mod: TC | Performed by: OPHTHALMOLOGY

## 2024-01-04 ASSESSMENT — REFRACTION_AUTOREFRACTION
OS_AXIS: 128
OS_SPHERE: +0.25
OD_SPHERE: +0.25
OD_CYLINDER: -1.50
OS_CYLINDER: -0.75
OD_AXIS: 074

## 2024-01-04 ASSESSMENT — REFRACTION_CURRENTRX
OS_SPHERE: +1.50
OD_SPHERE: +1.00
OS_CYLINDER: SPHERE
OS_OVR_VA: 20/
OD_VPRISM_DIRECTION: SV
OS_VPRISM_DIRECTION: SV
OD_SPHERE: -1.00
OS_CYLINDER: SPHERE
OD_OVR_VA: 20/
OS_AXIS: 0
OD_AXIS: 081
OS_VPRISM_DIRECTION: SV
OD_CYLINDER: -0.50
OS_SPHERE: PLANO
OS_OVR_VA: 20/
OD_AXIS: 085
OD_VPRISM_DIRECTION: SV
OS_AXIS: 000
OD_CYLINDER: -0.50
OD_OVR_VA: 20/

## 2024-01-04 ASSESSMENT — REFRACTION_MANIFEST
OS_SPHERE: +0.25
OS_VA1: 20/25+1
OS_CYLINDER: -0.50
OS_AXIS: 130
OS_AXIS: 130
OD_CYLINDER: +1.25
OD_SPHERE: PLANO
OD_VA1: 20/25+1
OS_SPHERE: +0.25
OS_VA1: 20/30-1
OD_SPHERE: PLANO
OS_CYLINDER: -0.75
OD_AXIS: 070
OD_CYLINDER: -1.25
OD_AXIS: 070

## 2024-01-04 ASSESSMENT — CONFRONTATIONAL VISUAL FIELD TEST (CVF)
OD_FINDINGS: FULL
OS_FINDINGS: FULL

## 2024-01-04 ASSESSMENT — SPHEQUIV_DERIVED
OS_SPHEQUIV: -0.125
OD_SPHEQUIV: -0.5
OS_SPHEQUIV: 0
OS_SPHEQUIV: -0.125

## 2024-01-04 ASSESSMENT — LID EXAM ASSESSMENTS
OS_BLEPHARITIS: LLL LUL 1+ 2+
OD_BLEPHARITIS: RLL RUL 1+ 2+

## 2024-01-16 ENCOUNTER — ASC (OUTPATIENT)
Dept: URBAN - METROPOLITAN AREA SURGERY 8 | Facility: SURGERY | Age: 69
Setting detail: OPHTHALMOLOGY
End: 2024-01-16
Payer: MEDICARE

## 2024-01-16 DIAGNOSIS — H25.11: ICD-10-CM

## 2024-01-16 PROCEDURE — 66984 XCAPSL CTRC RMVL W/O ECP: CPT | Mod: RT | Performed by: OPHTHALMOLOGY

## 2024-01-16 PROCEDURE — 68841 INSJ RX ELUT IMPLT LAC CANAL: CPT | Mod: RT | Performed by: OPHTHALMOLOGY

## 2024-01-17 ENCOUNTER — RX ONLY (RX ONLY)
Age: 69
End: 2024-01-17

## 2024-01-17 ENCOUNTER — OFFICE (OUTPATIENT)
Dept: URBAN - METROPOLITAN AREA CLINIC 12 | Facility: CLINIC | Age: 69
Setting detail: OPHTHALMOLOGY
End: 2024-01-17
Payer: MEDICARE

## 2024-01-17 DIAGNOSIS — Z96.1: ICD-10-CM

## 2024-01-17 PROCEDURE — 99024 POSTOP FOLLOW-UP VISIT: CPT | Performed by: OPTOMETRIST

## 2024-01-17 ASSESSMENT — REFRACTION_AUTOREFRACTION
OS_AXIS: 121
OD_CYLINDER: -0.50
OS_CYLINDER: -0.75
OS_SPHERE: +0.25
OD_SPHERE: PLANO
OD_AXIS: 075

## 2024-01-17 ASSESSMENT — SPHEQUIV_DERIVED
OS_SPHEQUIV: -0.125
OS_SPHEQUIV: 0
OS_SPHEQUIV: -0.125

## 2024-01-17 ASSESSMENT — CONFRONTATIONAL VISUAL FIELD TEST (CVF)
OS_FINDINGS: FULL
OD_FINDINGS: FULL

## 2024-01-17 ASSESSMENT — REFRACTION_MANIFEST
OS_SPHERE: +0.25
OD_AXIS: 070
OD_CYLINDER: -1.25
OD_CYLINDER: +1.25
OS_VA1: 20/30-1
OS_AXIS: 130
OD_AXIS: 070
OD_VA1: 20/25+1
OS_CYLINDER: -0.50
OD_SPHERE: PLANO
OD_SPHERE: PLANO
OS_SPHERE: +0.25
OS_VA1: 20/25+1
OS_AXIS: 130
OS_CYLINDER: -0.75

## 2024-01-17 ASSESSMENT — REFRACTION_CURRENTRX
OD_VPRISM_DIRECTION: SV
OD_AXIS: 085
OD_VPRISM_DIRECTION: SV
OS_AXIS: 0
OD_SPHERE: +1.00
OS_OVR_VA: 20/
OS_OVR_VA: 20/
OD_SPHERE: -1.00
OS_VPRISM_DIRECTION: SV
OD_OVR_VA: 20/
OS_CYLINDER: SPHERE
OS_AXIS: 000
OS_CYLINDER: SPHERE
OD_OVR_VA: 20/
OS_SPHERE: +1.50
OS_SPHERE: PLANO
OD_CYLINDER: -0.50
OS_VPRISM_DIRECTION: SV
OD_CYLINDER: -0.50
OD_AXIS: 081

## 2024-01-17 ASSESSMENT — LID EXAM ASSESSMENTS
OS_BLEPHARITIS: LLL LUL 1+ 2+
OD_BLEPHARITIS: RLL RUL 1+ 2+

## 2024-01-22 ENCOUNTER — OFFICE (OUTPATIENT)
Dept: URBAN - METROPOLITAN AREA CLINIC 12 | Facility: CLINIC | Age: 69
Setting detail: OPHTHALMOLOGY
End: 2024-01-22
Payer: MEDICARE

## 2024-01-22 DIAGNOSIS — H25.12: ICD-10-CM

## 2024-01-22 PROCEDURE — 92136 OPHTHALMIC BIOMETRY: CPT | Mod: 26,LT | Performed by: OPHTHALMOLOGY

## 2024-01-22 ASSESSMENT — REFRACTION_CURRENTRX
OS_VPRISM_DIRECTION: SV
OD_CYLINDER: -0.50
OS_VPRISM_DIRECTION: SV
OS_SPHERE: PLANO
OS_AXIS: 000
OD_SPHERE: +1.00
OS_OVR_VA: 20/
OD_VPRISM_DIRECTION: SV
OD_VPRISM_DIRECTION: SV
OS_AXIS: 0
OS_SPHERE: +1.50
OD_OVR_VA: 20/
OD_OVR_VA: 20/
OS_CYLINDER: SPHERE
OD_AXIS: 081
OD_AXIS: 085
OD_SPHERE: -1.00
OS_CYLINDER: SPHERE
OD_CYLINDER: -0.50
OS_OVR_VA: 20/

## 2024-01-22 ASSESSMENT — REFRACTION_MANIFEST
OD_AXIS: 070
OS_CYLINDER: -0.75
OS_SPHERE: +0.25
OS_AXIS: 130
OS_VA1: 20/25+1
OS_SPHERE: +0.25
OS_VA1: 20/30-1
OS_AXIS: 130
OD_AXIS: 070
OD_CYLINDER: +1.25
OD_CYLINDER: -1.25
OD_SPHERE: PLANO
OD_SPHERE: PLANO
OD_VA1: 20/25+1
OS_CYLINDER: -0.50

## 2024-01-22 ASSESSMENT — REFRACTION_AUTOREFRACTION
OS_CYLINDER: -0.75
OS_AXIS: 110
OD_AXIS: 077
OD_SPHERE: PLANO
OD_CYLINDER: -0.50
OS_SPHERE: +0.50

## 2024-01-22 ASSESSMENT — SPHEQUIV_DERIVED
OS_SPHEQUIV: 0
OS_SPHEQUIV: -0.125
OS_SPHEQUIV: 0.125

## 2024-01-22 ASSESSMENT — CONFRONTATIONAL VISUAL FIELD TEST (CVF)
OD_FINDINGS: FULL
OS_FINDINGS: FULL

## 2024-01-22 ASSESSMENT — LID EXAM ASSESSMENTS
OS_BLEPHARITIS: LLL LUL 1+ 2+
OD_BLEPHARITIS: RLL RUL 1+ 2+

## 2024-01-30 ENCOUNTER — ASC (OUTPATIENT)
Dept: URBAN - METROPOLITAN AREA SURGERY 8 | Facility: SURGERY | Age: 69
Setting detail: OPHTHALMOLOGY
End: 2024-01-30
Payer: MEDICARE

## 2024-01-30 DIAGNOSIS — H25.12: ICD-10-CM

## 2024-01-30 PROCEDURE — 66984 XCAPSL CTRC RMVL W/O ECP: CPT | Mod: 79,LT | Performed by: OPHTHALMOLOGY

## 2024-01-30 PROCEDURE — 68841 INSJ RX ELUT IMPLT LAC CANAL: CPT | Mod: 79,LT | Performed by: OPHTHALMOLOGY

## 2024-01-31 ENCOUNTER — OFFICE (OUTPATIENT)
Dept: URBAN - METROPOLITAN AREA CLINIC 12 | Facility: CLINIC | Age: 69
Setting detail: OPHTHALMOLOGY
End: 2024-01-31
Payer: MEDICARE

## 2024-01-31 DIAGNOSIS — Z96.1: ICD-10-CM

## 2024-01-31 PROBLEM — H25.12 CATARACT; LEFT EYE: Status: ACTIVE | Noted: 2024-01-17

## 2024-01-31 PROCEDURE — 99024 POSTOP FOLLOW-UP VISIT: CPT | Performed by: OPTOMETRIST

## 2024-01-31 ASSESSMENT — REFRACTION_MANIFEST
OD_AXIS: 070
OD_CYLINDER: -1.25
OD_SPHERE: PLANO
OD_AXIS: 070
OD_CYLINDER: +1.25
OS_CYLINDER: -0.50
OS_AXIS: 130
OS_SPHERE: +0.25
OD_VA1: 20/25+1
OS_VA1: 20/30-1
OD_SPHERE: PLANO
OS_SPHERE: +0.25
OS_AXIS: 130
OS_CYLINDER: -0.75
OS_VA1: 20/25+1

## 2024-01-31 ASSESSMENT — SPHEQUIV_DERIVED
OS_SPHEQUIV: -0.125
OS_SPHEQUIV: 0

## 2024-01-31 ASSESSMENT — REFRACTION_CURRENTRX
OD_AXIS: 085
OS_AXIS: 000
OD_CYLINDER: -0.50
OS_CYLINDER: SPHERE
OS_OVR_VA: 20/
OS_SPHERE: PLANO
OD_AXIS: 081
OS_OVR_VA: 20/
OD_SPHERE: +1.00
OD_OVR_VA: 20/
OD_VPRISM_DIRECTION: SV
OS_VPRISM_DIRECTION: SV
OD_CYLINDER: -0.50
OD_OVR_VA: 20/
OS_SPHERE: +1.50
OS_VPRISM_DIRECTION: SV
OD_VPRISM_DIRECTION: SV
OS_CYLINDER: SPHERE
OS_AXIS: 0
OD_SPHERE: -1.00

## 2024-01-31 ASSESSMENT — LID EXAM ASSESSMENTS
OD_BLEPHARITIS: RLL RUL 1+ 2+
OS_BLEPHARITIS: LLL LUL 1+ 2+

## 2024-01-31 ASSESSMENT — CONFRONTATIONAL VISUAL FIELD TEST (CVF)
OS_FINDINGS: FULL
OD_FINDINGS: FULL

## 2024-02-06 ENCOUNTER — OFFICE (OUTPATIENT)
Dept: URBAN - METROPOLITAN AREA CLINIC 12 | Facility: CLINIC | Age: 69
Setting detail: OPHTHALMOLOGY
End: 2024-02-06
Payer: MEDICARE

## 2024-02-06 DIAGNOSIS — Z96.1: ICD-10-CM

## 2024-02-06 PROCEDURE — 99024 POSTOP FOLLOW-UP VISIT: CPT | Performed by: OPTOMETRIST

## 2024-02-06 ASSESSMENT — REFRACTION_MANIFEST
OS_VA1: 20/30-1
OD_AXIS: 070
OS_VA1: 20/25+1
OD_AXIS: 070
OD_VA1: 20/25+1
OS_CYLINDER: -0.75
OD_CYLINDER: +1.25
OS_AXIS: 130
OS_SPHERE: +0.25
OD_SPHERE: PLANO
OS_SPHERE: +0.25
OS_AXIS: 130
OD_CYLINDER: -1.25
OD_SPHERE: PLANO
OS_CYLINDER: -0.50

## 2024-02-06 ASSESSMENT — REFRACTION_CURRENTRX
OD_AXIS: 081
OS_VPRISM_DIRECTION: SV
OD_VPRISM_DIRECTION: SV
OS_AXIS: 000
OS_VPRISM_DIRECTION: SV
OD_SPHERE: +1.00
OS_OVR_VA: 20/
OD_CYLINDER: -0.50
OD_VPRISM_DIRECTION: SV
OS_OVR_VA: 20/
OD_AXIS: 085
OD_SPHERE: -1.00
OS_CYLINDER: SPHERE
OS_SPHERE: +1.50
OS_SPHERE: PLANO
OS_AXIS: 0
OD_OVR_VA: 20/
OD_OVR_VA: 20/
OD_CYLINDER: -0.50
OS_CYLINDER: SPHERE

## 2024-02-06 ASSESSMENT — REFRACTION_AUTOREFRACTION
OD_AXIS: 087
OS_CYLINDER: -0.50
OS_AXIS: 085
OD_CYLINDER: -0.75
OS_SPHERE: +0.25
OD_SPHERE: PLANO

## 2024-02-06 ASSESSMENT — CONFRONTATIONAL VISUAL FIELD TEST (CVF)
OD_FINDINGS: FULL
OS_FINDINGS: FULL

## 2024-02-06 ASSESSMENT — SPHEQUIV_DERIVED
OS_SPHEQUIV: -0.125
OS_SPHEQUIV: 0
OS_SPHEQUIV: 0

## 2024-02-06 ASSESSMENT — LID EXAM ASSESSMENTS
OD_BLEPHARITIS: RLL RUL 1+ 2+
OS_BLEPHARITIS: LLL LUL 1+ 2+

## 2024-02-29 ENCOUNTER — OFFICE (OUTPATIENT)
Dept: URBAN - METROPOLITAN AREA CLINIC 12 | Facility: CLINIC | Age: 69
Setting detail: OPHTHALMOLOGY
End: 2024-02-29
Payer: MEDICARE

## 2024-02-29 DIAGNOSIS — Z96.1: ICD-10-CM

## 2024-02-29 DIAGNOSIS — H52.4: ICD-10-CM

## 2024-02-29 PROBLEM — H52.7 REFRACTIVE ERROR: Status: ACTIVE | Noted: 2024-02-29

## 2024-02-29 PROCEDURE — 92015 DETERMINE REFRACTIVE STATE: CPT | Performed by: OPTOMETRIST

## 2024-02-29 PROCEDURE — 99024 POSTOP FOLLOW-UP VISIT: CPT | Performed by: OPTOMETRIST

## 2024-02-29 ASSESSMENT — REFRACTION_CURRENTRX
OS_OVR_VA: 20/
OD_CYLINDER: -0.50
OS_SPHERE: +1.50
OD_OVR_VA: 20/
OS_OVR_VA: 20/
OS_AXIS: 000
OD_VPRISM_DIRECTION: SV
OD_SPHERE: +1.00
OD_AXIS: 081
OD_SPHERE: -1.00
OS_VPRISM_DIRECTION: SV
OS_CYLINDER: SPHERE
OS_VPRISM_DIRECTION: SV
OS_CYLINDER: SPHERE
OD_OVR_VA: 20/
OS_SPHERE: PLANO
OS_AXIS: 0
OD_CYLINDER: -0.50
OD_AXIS: 085
OD_VPRISM_DIRECTION: SV

## 2024-02-29 ASSESSMENT — LID EXAM ASSESSMENTS
OS_BLEPHARITIS: LLL LUL 1+ 2+
OD_BLEPHARITIS: RLL RUL 1+ 2+

## 2024-02-29 ASSESSMENT — REFRACTION_MANIFEST
OD_ADD: +2.50
OS_ADD: +2.50
OS_AXIS: 090
OD_AXIS: 070
OS_SPHERE: PLANO
OD_SPHERE: PLANO
OS_CYLINDER: -0.50
OS_AXIS: 130
OD_CYLINDER: -0.50
OD_VA1: 20/20
OD_AXIS: 085
OS_VA1: 20/30-1
OS_CYLINDER: -0.25
OS_VA1: 20/20-
OD_CYLINDER: +1.25
OS_SPHERE: +0.25
OD_SPHERE: PLANO
OD_VA1: 20/25+1

## 2024-02-29 ASSESSMENT — CONFRONTATIONAL VISUAL FIELD TEST (CVF)
OD_FINDINGS: FULL
OS_FINDINGS: FULL

## 2024-02-29 ASSESSMENT — SPHEQUIV_DERIVED: OS_SPHEQUIV: 0

## 2024-03-19 ENCOUNTER — OFFICE (OUTPATIENT)
Dept: URBAN - METROPOLITAN AREA CLINIC 12 | Facility: CLINIC | Age: 69
Setting detail: OPHTHALMOLOGY
End: 2024-03-19
Payer: MEDICARE

## 2024-03-19 DIAGNOSIS — H11.31: ICD-10-CM

## 2024-03-19 PROCEDURE — 99212 OFFICE O/P EST SF 10 MIN: CPT | Mod: 24 | Performed by: OPTOMETRIST

## 2024-03-19 ASSESSMENT — REFRACTION_MANIFEST
OD_ADD: +2.50
OD_AXIS: 070
OS_VA1: 20/20-
OD_CYLINDER: -0.50
OS_AXIS: 130
OS_SPHERE: +0.25
OS_CYLINDER: -0.50
OS_ADD: +2.50
OD_CYLINDER: +1.25
OS_VA1: 20/30-1
OD_AXIS: 085
OD_SPHERE: PLANO
OS_AXIS: 090
OS_SPHERE: PLANO
OD_VA1: 20/20
OD_SPHERE: PLANO
OD_VA1: 20/25+1
OS_CYLINDER: -0.25

## 2024-03-19 ASSESSMENT — REFRACTION_CURRENTRX
OD_SPHERE: -1.00
OD_OVR_VA: 20/
OS_OVR_VA: 20/
OS_AXIS: 000
OS_VPRISM_DIRECTION: SV
OD_CYLINDER: -0.50
OS_CYLINDER: SPHERE
OD_AXIS: 081
OD_VPRISM_DIRECTION: SV
OD_SPHERE: +1.00
OD_VPRISM_DIRECTION: SV
OS_OVR_VA: 20/
OS_AXIS: 0
OD_CYLINDER: -0.50
OS_CYLINDER: SPHERE
OD_OVR_VA: 20/
OS_VPRISM_DIRECTION: SV
OS_SPHERE: PLANO
OS_SPHERE: +1.50
OD_AXIS: 085

## 2024-03-19 ASSESSMENT — SPHEQUIV_DERIVED: OS_SPHEQUIV: 0

## 2024-03-19 ASSESSMENT — LID EXAM ASSESSMENTS
OD_BLEPHARITIS: RLL RUL 1+ 2+
OS_BLEPHARITIS: LLL LUL 1+ 2+

## 2024-03-28 ENCOUNTER — OFFICE (OUTPATIENT)
Dept: URBAN - METROPOLITAN AREA CLINIC 12 | Facility: CLINIC | Age: 69
Setting detail: OPHTHALMOLOGY
End: 2024-03-28
Payer: MEDICARE

## 2024-03-28 DIAGNOSIS — H11.31: ICD-10-CM

## 2024-03-28 DIAGNOSIS — Z96.1: ICD-10-CM

## 2024-03-28 DIAGNOSIS — H52.7: ICD-10-CM

## 2024-03-28 DIAGNOSIS — H01.002: ICD-10-CM

## 2024-03-28 DIAGNOSIS — H01.004: ICD-10-CM

## 2024-03-28 DIAGNOSIS — H01.005: ICD-10-CM

## 2024-03-28 DIAGNOSIS — H01.001: ICD-10-CM

## 2024-03-28 PROCEDURE — 99024 POSTOP FOLLOW-UP VISIT: CPT | Performed by: OPHTHALMOLOGY

## 2024-03-28 ASSESSMENT — LID EXAM ASSESSMENTS
OS_BLEPHARITIS: LLL LUL 1+ 2+
OD_BLEPHARITIS: RLL RUL 1+ 2+

## 2024-03-28 ASSESSMENT — REFRACTION_CURRENTRX
OS_SPHERE: PLANO
OS_SPHERE: +1.50
OD_CYLINDER: -0.50
OD_OVR_VA: 20/
OD_SPHERE: +1.00
OS_VPRISM_DIRECTION: SV
OD_CYLINDER: -0.50
OD_VPRISM_DIRECTION: SV
OS_VPRISM_DIRECTION: SV
OS_CYLINDER: SPHERE
OS_OVR_VA: 20/
OD_VPRISM_DIRECTION: SV
OS_AXIS: 000
OS_CYLINDER: SPHERE
OS_OVR_VA: 20/
OS_AXIS: 0
OD_SPHERE: -1.00
OD_AXIS: 081
OD_AXIS: 085
OD_OVR_VA: 20/

## 2024-03-28 ASSESSMENT — REFRACTION_MANIFEST
OS_ADD: +2.50
OS_SPHERE: PLANO
OS_VA1: 20/20-
OD_CYLINDER: -0.50
OD_CYLINDER: +1.25
OD_SPHERE: PLANO
OS_SPHERE: +0.25
OD_AXIS: 070
OS_VA1: 20/30-1
OS_AXIS: 090
OD_ADD: +2.50
OD_SPHERE: PLANO
OD_VA1: 20/25+1
OD_VA1: 20/20
OS_CYLINDER: -0.25
OD_AXIS: 085
OS_CYLINDER: -0.50
OS_AXIS: 130

## 2024-03-28 ASSESSMENT — SPHEQUIV_DERIVED: OS_SPHEQUIV: 0

## 2024-05-30 ENCOUNTER — OFFICE (OUTPATIENT)
Dept: URBAN - METROPOLITAN AREA CLINIC 88 | Facility: CLINIC | Age: 69
Setting detail: OPHTHALMOLOGY
End: 2024-05-30
Payer: MEDICARE

## 2024-05-30 VITALS — HEIGHT: 55 IN

## 2024-05-30 DIAGNOSIS — H01.005: ICD-10-CM

## 2024-05-30 DIAGNOSIS — H01.001: ICD-10-CM

## 2024-05-30 DIAGNOSIS — H01.004: ICD-10-CM

## 2024-05-30 DIAGNOSIS — H01.002: ICD-10-CM

## 2024-05-30 PROCEDURE — 99213 OFFICE O/P EST LOW 20 MIN: CPT | Performed by: OPTOMETRIST

## 2024-05-30 ASSESSMENT — CONFRONTATIONAL VISUAL FIELD TEST (CVF)
OS_FINDINGS: FULL
OD_FINDINGS: FULL

## 2024-05-30 ASSESSMENT — LID EXAM ASSESSMENTS
OS_BLEPHARITIS: LLL LUL 1+ 2+
OD_BLEPHARITIS: RLL RUL 1+ 2+

## 2024-06-15 ENCOUNTER — OFFICE (OUTPATIENT)
Dept: URBAN - METROPOLITAN AREA CLINIC 12 | Facility: CLINIC | Age: 69
Setting detail: OPHTHALMOLOGY
End: 2024-06-15
Payer: MEDICARE

## 2024-06-15 DIAGNOSIS — H01.002: ICD-10-CM

## 2024-06-15 DIAGNOSIS — Z96.1: ICD-10-CM

## 2024-06-15 DIAGNOSIS — H01.001: ICD-10-CM

## 2024-06-15 DIAGNOSIS — H52.7: ICD-10-CM

## 2024-06-15 DIAGNOSIS — H01.004: ICD-10-CM

## 2024-06-15 DIAGNOSIS — H01.005: ICD-10-CM

## 2024-06-15 PROCEDURE — 92014 COMPRE OPH EXAM EST PT 1/>: CPT | Performed by: OPTOMETRIST

## 2024-06-15 ASSESSMENT — LID EXAM ASSESSMENTS
OD_BLEPHARITIS: RLL RUL 1+ 2+
OS_BLEPHARITIS: LLL LUL 1+ 2+

## 2024-06-15 ASSESSMENT — CONFRONTATIONAL VISUAL FIELD TEST (CVF)
OS_FINDINGS: FULL
OD_FINDINGS: FULL

## 2024-08-19 ENCOUNTER — NON-APPOINTMENT (OUTPATIENT)
Age: 69
End: 2024-08-19

## 2024-10-17 ENCOUNTER — APPOINTMENT (OUTPATIENT)
Dept: OBGYN | Facility: CLINIC | Age: 69
End: 2024-10-17

## 2024-10-17 VITALS
DIASTOLIC BLOOD PRESSURE: 62 MMHG | BODY MASS INDEX: 25.69 KG/M2 | HEIGHT: 63 IN | SYSTOLIC BLOOD PRESSURE: 110 MMHG | WEIGHT: 145 LBS

## 2024-10-17 DIAGNOSIS — Z12.11 ENCOUNTER FOR SCREENING FOR MALIGNANT NEOPLASM OF COLON: ICD-10-CM

## 2024-10-17 DIAGNOSIS — Z12.4 ENCOUNTER FOR SCREENING FOR MALIGNANT NEOPLASM OF CERVIX: ICD-10-CM

## 2024-10-17 DIAGNOSIS — M85.80 OTHER SPECIFIED DISORDERS OF BONE DENSITY AND STRUCTURE, UNSPECIFIED SITE: ICD-10-CM

## 2024-10-17 DIAGNOSIS — N90.7 VULVAR CYST: ICD-10-CM

## 2024-10-17 DIAGNOSIS — Z63.4 DISAPPEARANCE AND DEATH OF FAMILY MEMBER: ICD-10-CM

## 2024-10-17 DIAGNOSIS — Z01.419 ENCOUNTER FOR GYNECOLOGICAL EXAMINATION (GENERAL) (ROUTINE) W/OUT ABNORMAL FINDINGS: ICD-10-CM

## 2024-10-17 DIAGNOSIS — Z11.51 ENCOUNTER FOR SCREENING FOR HUMAN PAPILLOMAVIRUS (HPV): ICD-10-CM

## 2024-10-17 PROCEDURE — 82270 OCCULT BLOOD FECES: CPT

## 2024-10-17 PROCEDURE — G0101: CPT

## 2024-10-17 PROCEDURE — 99397 PER PM REEVAL EST PAT 65+ YR: CPT | Mod: GY

## 2024-10-17 SDOH — SOCIAL STABILITY - SOCIAL INSECURITY: DISSAPEARANCE AND DEATH OF FAMILY MEMBER: Z63.4

## 2024-10-18 LAB — HPV HIGH+LOW RISK DNA PNL CVX: NOT DETECTED

## 2024-10-23 LAB — CYTOLOGY CVX/VAG DOC THIN PREP: NORMAL

## 2025-06-21 ENCOUNTER — OFFICE (OUTPATIENT)
Dept: URBAN - METROPOLITAN AREA CLINIC 12 | Facility: CLINIC | Age: 70
Setting detail: OPHTHALMOLOGY
End: 2025-06-21
Payer: MEDICARE

## 2025-06-21 DIAGNOSIS — H52.4: ICD-10-CM

## 2025-06-21 DIAGNOSIS — H01.001: ICD-10-CM

## 2025-06-21 DIAGNOSIS — H01.005: ICD-10-CM

## 2025-06-21 DIAGNOSIS — H01.004: ICD-10-CM

## 2025-06-21 DIAGNOSIS — H01.002: ICD-10-CM

## 2025-06-21 PROBLEM — H02.824 LID LESION; LEFT UPPER LID: Status: ACTIVE | Noted: 2025-06-21

## 2025-06-21 PROCEDURE — 92015 DETERMINE REFRACTIVE STATE: CPT | Performed by: OPTOMETRIST

## 2025-06-21 PROCEDURE — 92014 COMPRE OPH EXAM EST PT 1/>: CPT | Performed by: OPTOMETRIST

## 2025-06-21 ASSESSMENT — REFRACTION_CURRENTRX
OS_OVR_VA: 20/
OD_SPHERE: +1.00
OD_VPRISM_DIRECTION: SV
OS_CYLINDER: SPHERE
OD_OVR_VA: 20/
OS_VPRISM_DIRECTION: SV
OD_OVR_VA: 20/
OD_CYLINDER: -0.50
OS_SPHERE: +1.50
OS_VPRISM_DIRECTION: SV
OD_CYLINDER: -0.50
OS_AXIS: 0
OS_CYLINDER: SPHERE
OD_AXIS: 085
OS_OVR_VA: 20/
OS_SPHERE: PLANO
OD_VPRISM_DIRECTION: SV
OD_SPHERE: -1.00
OD_AXIS: 081
OS_AXIS: 000

## 2025-06-21 ASSESSMENT — REFRACTION_MANIFEST
OD_SPHERE: PLANO
OS_AXIS: 090
OS_SPHERE: PLANO
OD_AXIS: 080
OD_VA1: 20/20
OD_SPHERE: PLANO
OD_CYLINDER: -0.75
OS_SPHERE: PLANO
OS_ADD: +2.50
OD_VA1: 20/20-
OD_AXIS: 085
OS_AXIS: 090
OD_CYLINDER: -0.50
OS_VA1: 20/20-
OS_ADD: +2.50
OD_ADD: +2.50
OS_CYLINDER: -0.25
OD_ADD: +2.50
OS_CYLINDER: -0.25
OS_VA1: 20/20

## 2025-06-21 ASSESSMENT — REFRACTION_AUTOREFRACTION
OD_SPHERE: +0.50
OS_AXIS: 93
OD_AXIS: 69
OD_CYLINDER: -0.75
OS_SPHERE: +0.50
OS_CYLINDER: -0.75

## 2025-06-21 ASSESSMENT — CONFRONTATIONAL VISUAL FIELD TEST (CVF)
OS_FINDINGS: FULL
OD_FINDINGS: FULL

## 2025-06-21 ASSESSMENT — TONOMETRY
OS_IOP_MMHG: 15
OD_IOP_MMHG: 12

## 2025-06-21 ASSESSMENT — LID EXAM ASSESSMENTS
OS_BLEPHARITIS: LLL LUL 1+ 2+
OD_BLEPHARITIS: RLL RUL 1+ 2+

## 2025-06-21 ASSESSMENT — KERATOMETRY
OD_K2POWER_DIOPTERS: 45.75
METHOD_AUTO_MANUAL: AUTO
OD_K1POWER_DIOPTERS: 45.25
OD_AXISANGLE_DEGREES: 155
OS_K2POWER_DIOPTERS: 46.00
OS_K1POWER_DIOPTERS: 45.75
OS_AXISANGLE_DEGREES: 4

## 2025-06-21 ASSESSMENT — VISUAL ACUITY
OS_BCVA: 20/40
OD_BCVA: 20/25-2

## 2025-07-18 ENCOUNTER — OFFICE (OUTPATIENT)
Dept: URBAN - METROPOLITAN AREA CLINIC 100 | Facility: CLINIC | Age: 70
Setting detail: OPHTHALMOLOGY
End: 2025-07-18
Payer: MEDICARE

## 2025-07-18 DIAGNOSIS — H02.824: ICD-10-CM

## 2025-07-18 PROBLEM — H01.004 BLEPHARITIS; RIGHT UPPER LID, LEFT UPPER LID , RIGHT LOWER LID, LEFT LOWER LID: Status: ACTIVE | Noted: 2025-07-18

## 2025-07-18 PROBLEM — H01.001 BLEPHARITIS; RIGHT UPPER LID, LEFT UPPER LID , RIGHT LOWER LID, LEFT LOWER LID: Status: ACTIVE | Noted: 2025-07-18

## 2025-07-18 PROBLEM — H01.005 BLEPHARITIS; RIGHT UPPER LID, LEFT UPPER LID , RIGHT LOWER LID, LEFT LOWER LID: Status: ACTIVE | Noted: 2025-07-18

## 2025-07-18 PROBLEM — H01.002 BLEPHARITIS; RIGHT UPPER LID, LEFT UPPER LID , RIGHT LOWER LID, LEFT LOWER LID: Status: ACTIVE | Noted: 2025-07-18

## 2025-07-18 PROCEDURE — 99213 OFFICE O/P EST LOW 20 MIN: CPT | Performed by: STUDENT IN AN ORGANIZED HEALTH CARE EDUCATION/TRAINING PROGRAM

## 2025-07-18 ASSESSMENT — CONFRONTATIONAL VISUAL FIELD TEST (CVF)
OD_FINDINGS: FULL
OS_FINDINGS: FULL

## 2025-07-18 ASSESSMENT — LID EXAM ASSESSMENTS
OS_BLEPHARITIS: LLL LUL 1+ 2+
OD_BLEPHARITIS: RLL RUL 1+ 2+

## 2025-07-22 ASSESSMENT — REFRACTION_AUTOREFRACTION
OD_CYLINDER: -0.75
OD_SPHERE: +0.50
OS_CYLINDER: -0.75
OS_AXIS: 93
OD_AXIS: 69
OS_SPHERE: +0.50

## 2025-07-22 ASSESSMENT — VISUAL ACUITY
OS_BCVA: 20/50+
OD_BCVA: 20/25+2

## 2025-07-22 ASSESSMENT — KERATOMETRY
OD_K1POWER_DIOPTERS: 45.25
OS_K1POWER_DIOPTERS: 45.75
METHOD_AUTO_MANUAL: AUTO
OD_AXISANGLE_DEGREES: 155
OS_K2POWER_DIOPTERS: 46.00
OD_K2POWER_DIOPTERS: 45.75
OS_AXISANGLE_DEGREES: 4